# Patient Record
Sex: MALE | Race: WHITE | NOT HISPANIC OR LATINO | Employment: OTHER | URBAN - NONMETROPOLITAN AREA
[De-identification: names, ages, dates, MRNs, and addresses within clinical notes are randomized per-mention and may not be internally consistent; named-entity substitution may affect disease eponyms.]

---

## 2018-12-05 ENCOUNTER — TRANSCRIBE ORDERS (OUTPATIENT)
Dept: ADMINISTRATIVE | Facility: HOSPITAL | Age: 58
End: 2018-12-05

## 2018-12-05 DIAGNOSIS — B18.2 HEP C W/O COMA, CHRONIC (HCC): Primary | ICD-10-CM

## 2018-12-18 ENCOUNTER — LAB (OUTPATIENT)
Dept: LAB | Facility: HOSPITAL | Age: 58
End: 2018-12-18

## 2018-12-18 ENCOUNTER — HOSPITAL ENCOUNTER (OUTPATIENT)
Dept: ULTRASOUND IMAGING | Facility: HOSPITAL | Age: 58
Discharge: HOME | End: 2018-12-18
Admitting: NURSE PRACTITIONER

## 2018-12-18 ENCOUNTER — TRANSCRIBE ORDERS (OUTPATIENT)
Dept: LAB | Facility: HOSPITAL | Age: 58
End: 2018-12-18

## 2018-12-18 DIAGNOSIS — B18.1 HEPATITIS B, CHRONIC (HCC): ICD-10-CM

## 2018-12-18 DIAGNOSIS — B18.2 HEP C W/O COMA, CHRONIC (HCC): ICD-10-CM

## 2018-12-18 DIAGNOSIS — D64.9 ANEMIA, UNSPECIFIED TYPE: ICD-10-CM

## 2018-12-18 DIAGNOSIS — B19.20 HEPATITIS C VIRUS INFECTION WITHOUT HEPATIC COMA, UNSPECIFIED CHRONICITY: ICD-10-CM

## 2018-12-18 DIAGNOSIS — B19.20 HEPATITIS C VIRUS INFECTION WITHOUT HEPATIC COMA, UNSPECIFIED CHRONICITY: Primary | ICD-10-CM

## 2018-12-18 LAB
ALBUMIN SERPL-MCNC: 4.2 G/DL (ref 3.5–5)
ALBUMIN/GLOB SERPL: 1.1 G/DL (ref 1.5–2.5)
ALP SERPL-CCNC: 226 U/L (ref 40–129)
ALT SERPL W P-5'-P-CCNC: 75 U/L (ref 10–44)
ANION GAP SERPL CALCULATED.3IONS-SCNC: 3.8 MMOL/L (ref 3.6–11.2)
AST SERPL-CCNC: 88 U/L (ref 10–34)
BILIRUB SERPL-MCNC: 0.6 MG/DL (ref 0.2–1.8)
BUN BLD-MCNC: 15 MG/DL (ref 7–21)
BUN/CREAT SERPL: 15.6 (ref 7–25)
CALCIUM SPEC-SCNC: 9.5 MG/DL (ref 7.7–10)
CHLORIDE SERPL-SCNC: 105 MMOL/L (ref 99–112)
CO2 SERPL-SCNC: 28.2 MMOL/L (ref 24.3–31.9)
CREAT BLD-MCNC: 0.96 MG/DL (ref 0.43–1.29)
FERRITIN SERPL-MCNC: 132 NG/ML (ref 21.9–321.7)
GFR SERPL CREATININE-BSD FRML MDRD: 80 ML/MIN/1.73
GLOBULIN UR ELPH-MCNC: 4 GM/DL
GLUCOSE BLD-MCNC: 92 MG/DL (ref 70–110)
IRON 24H UR-MRATE: 56 MCG/DL (ref 53–167)
IRON SATN MFR SERPL: 13 % (ref 20–50)
OSMOLALITY SERPL CALC.SUM OF ELEC: 274.3 MOSM/KG (ref 273–305)
POTASSIUM BLD-SCNC: 5.2 MMOL/L (ref 3.5–5.3)
PROT SERPL-MCNC: 8.2 G/DL (ref 6–8)
SODIUM BLD-SCNC: 137 MMOL/L (ref 135–153)
TIBC SERPL-MCNC: 433 MCG/DL (ref 241–421)

## 2018-12-18 PROCEDURE — 80053 COMPREHEN METABOLIC PANEL: CPT

## 2018-12-18 PROCEDURE — 82728 ASSAY OF FERRITIN: CPT

## 2018-12-18 PROCEDURE — 86235 NUCLEAR ANTIGEN ANTIBODY: CPT

## 2018-12-18 PROCEDURE — 76700 US EXAM ABDOM COMPLETE: CPT | Performed by: RADIOLOGY

## 2018-12-18 PROCEDURE — 36415 COLL VENOUS BLD VENIPUNCTURE: CPT

## 2018-12-18 PROCEDURE — 86225 DNA ANTIBODY NATIVE: CPT

## 2018-12-18 PROCEDURE — 83540 ASSAY OF IRON: CPT

## 2018-12-18 PROCEDURE — 87517 HEPATITIS B DNA QUANT: CPT

## 2018-12-18 PROCEDURE — 76700 US EXAM ABDOM COMPLETE: CPT

## 2018-12-18 PROCEDURE — 83550 IRON BINDING TEST: CPT

## 2018-12-18 PROCEDURE — 87350 HEPATITIS BE AG IA: CPT

## 2018-12-20 LAB
CENTROMERE B AB SER-ACNC: <0.2 AI (ref 0–0.9)
CHROMATIN AB SERPL-ACNC: <0.2 AI (ref 0–0.9)
DSDNA AB SER-ACNC: 3 IU/ML (ref 0–9)
ENA JO1 AB SER-ACNC: <0.2 AI (ref 0–0.9)
ENA RNP AB SER-ACNC: 0.4 AI (ref 0–0.9)
ENA SCL70 AB SER-ACNC: <0.2 AI (ref 0–0.9)
ENA SM AB SER-ACNC: <0.2 AI (ref 0–0.9)
ENA SS-A AB SER-ACNC: <0.2 AI (ref 0–0.9)
ENA SS-B AB SER-ACNC: <0.2 AI (ref 0–0.9)
HBV E AG SERPL QL IA: POSITIVE
Lab: NORMAL

## 2018-12-21 LAB
HBV DNA SERPL NAA+PROBE-ACNC: NORMAL IU/ML
LOG10 HBV IU/ML: 6.78 LOG10 IU/ML
TEST INFORMATION: NORMAL

## 2019-11-06 ENCOUNTER — OFFICE VISIT (OUTPATIENT)
Dept: PHARMACY | Facility: HOSPITAL | Age: 59
End: 2019-11-06

## 2019-11-06 VITALS
OXYGEN SATURATION: 98 % | SYSTOLIC BLOOD PRESSURE: 141 MMHG | HEART RATE: 88 BPM | WEIGHT: 170 LBS | DIASTOLIC BLOOD PRESSURE: 97 MMHG | HEIGHT: 69 IN | BODY MASS INDEX: 25.18 KG/M2

## 2019-11-06 DIAGNOSIS — B18.1 HEPATITIS B, CHRONIC (HCC): Primary | ICD-10-CM

## 2019-11-06 DIAGNOSIS — Z86.19 HISTORY OF HEPATITIS C: ICD-10-CM

## 2019-11-06 PROCEDURE — 99203 OFFICE O/P NEW LOW 30 MIN: CPT | Performed by: PHYSICIAN ASSISTANT

## 2019-11-06 RX ORDER — TIZANIDINE 4 MG/1
TABLET ORAL
Refills: 0 | COMMUNITY
Start: 2019-10-22

## 2019-11-06 RX ORDER — GABAPENTIN 800 MG/1
TABLET ORAL
Refills: 0 | COMMUNITY
Start: 2019-10-24

## 2019-11-06 RX ORDER — CLONIDINE HYDROCHLORIDE 0.2 MG/1
TABLET ORAL
Refills: 0 | COMMUNITY
Start: 2019-10-22

## 2019-11-06 RX ORDER — MIRTAZAPINE 45 MG/1
TABLET, FILM COATED ORAL
Refills: 0 | COMMUNITY
Start: 2019-10-22

## 2019-11-06 RX ORDER — CLONAZEPAM 1 MG/1
TABLET ORAL
Refills: 0 | COMMUNITY
Start: 2019-10-24

## 2019-11-06 RX ORDER — LISINOPRIL 40 MG/1
TABLET ORAL
Refills: 0 | COMMUNITY
Start: 2019-10-22

## 2019-11-06 RX ORDER — ERGOCALCIFEROL 1.25 MG/1
CAPSULE ORAL
Refills: 0 | COMMUNITY
Start: 2019-10-22

## 2019-11-06 RX ORDER — PRAVASTATIN SODIUM 40 MG
TABLET ORAL
Refills: 0 | COMMUNITY
Start: 2019-10-22

## 2019-11-06 RX ORDER — TENOFOVIR DISOPROXIL FUMARATE 300 MG/1
TABLET, FILM COATED ORAL
Refills: 2 | COMMUNITY
Start: 2019-10-25

## 2019-11-06 RX ORDER — AMLODIPINE BESYLATE 10 MG/1
TABLET ORAL
Refills: 0 | COMMUNITY
Start: 2019-10-22

## 2019-11-06 RX ORDER — BACLOFEN 20 MG/1
TABLET ORAL
Refills: 0 | COMMUNITY
Start: 2019-10-22

## 2019-11-06 RX ORDER — PAROXETINE HYDROCHLORIDE 20 MG/1
TABLET, FILM COATED ORAL
Refills: 0 | COMMUNITY
Start: 2019-10-22

## 2019-11-06 NOTE — PROGRESS NOTES
": 1960    Chief Complaint   Patient presents with   • Hepatitis C       Carter Neville is a 59 y.o. male who presents to the office today as a consultation from ADRIA Jolley for evaluation of Hepatitis C.    History of Present Illness:  He has been told recently that he cleared Hepatitis C infection on his own without treatment. He is following with Dr. Ortiz, last saw him approx 3 months ago, has upcoming appointment for monitoring. He is taking Viread for treatment of chronic Hepatitis B infection. Was told last visit that his Hep B infection is now \"barely detectable\" and everything was going well. Last labs with PCP 2019 showed normal AST/ALT but elevated alk phos. He would like to continue care with current GI provider. He is not currently using illicit drugs or alcohol. He is feeling well. He had 1st in series of Hep A vaccination, planning to get the 2nd as scheduled.    Review of Systems   Constitutional: Negative for chills, fatigue and fever.   HENT: Negative for trouble swallowing.    Eyes: Negative.    Respiratory: Negative for cough, choking, chest tightness and shortness of breath.    Cardiovascular: Negative for chest pain.   Gastrointestinal: Positive for abdominal pain, anal bleeding, blood in stool, constipation and diarrhea. Negative for abdominal distention, nausea and vomiting.   Endocrine: Negative.    Genitourinary: Negative for difficulty urinating.   Musculoskeletal: Positive for back pain and neck pain.   Skin: Negative for color change, pallor, rash and wound.   Allergic/Immunologic: Negative for environmental allergies and food allergies.   Neurological: Negative for dizziness, light-headedness and headaches.   Hematological: Bruises/bleeds easily.   Psychiatric/Behavioral: Negative.        Past Medical History:   Diagnosis Date   • Hyperlipidemia    • Hypertension    • Infectious viral hepatitis        Past Surgical History:   Procedure Laterality Date   • APPENDECTOMY     • " "CHOLECYSTECTOMY     • HAND SURGERY Right    • HEMORRHOIDECTOMY         Family History   Problem Relation Age of Onset   • Liver cancer Mother    • Lung cancer Father        Social History     Socioeconomic History   • Marital status:      Spouse name: Not on file   • Number of children: Not on file   • Years of education: Not on file   • Highest education level: Not on file   Tobacco Use   • Smoking status: Never Smoker   • Smokeless tobacco: Never Used   Substance and Sexual Activity   • Alcohol use: No     Frequency: Never   • Drug use: No   • Sexual activity: Defer       Current Outpatient Medications:   •  amLODIPine (NORVASC) 10 MG tablet, , Disp: , Rfl: 0  •  baclofen (LIORESAL) 20 MG tablet, , Disp: , Rfl: 0  •  clonazePAM (KlonoPIN) 1 MG tablet, , Disp: , Rfl: 0  •  cloNIDine (CATAPRES) 0.2 MG tablet, , Disp: , Rfl: 0  •  ergocalciferol (ERGOCALCIFEROL) 1.25 MG (49434 UT) capsule, , Disp: , Rfl: 0  •  gabapentin (NEURONTIN) 800 MG tablet, , Disp: , Rfl: 0  •  lisinopril (PRINIVIL,ZESTRIL) 40 MG tablet, , Disp: , Rfl: 0  •  mirtazapine (REMERON) 45 MG tablet, , Disp: , Rfl: 0  •  PARoxetine (PAXIL) 20 MG tablet, , Disp: , Rfl: 0  •  pravastatin (PRAVACHOL) 40 MG tablet, , Disp: , Rfl: 0  •  tenofovir (VIREAD) 300 MG tablet, , Disp: , Rfl: 2  •  tiZANidine (ZANAFLEX) 4 MG tablet, , Disp: , Rfl: 0    Allergies:   Patient has no known allergies.    Vitals:  /97   Pulse 88   Ht 175.3 cm (69\")   Wt 77.1 kg (170 lb)   SpO2 98%   BMI 25.10 kg/m²     Physical Exam   Constitutional: He is oriented to person, place, and time. He appears well-developed and well-nourished. No distress.   HENT:   Head: Normocephalic and atraumatic.   Nose: Nose normal.   Mouth/Throat: Oropharynx is clear and moist.   Eyes: Conjunctivae are normal. Right eye exhibits no discharge. Left eye exhibits no discharge. No scleral icterus.   Neck: Normal range of motion. No JVD present.   Pulmonary/Chest: Effort normal. No " respiratory distress.   Musculoskeletal: Normal range of motion. He exhibits no edema or deformity.   Neurological: He is alert and oriented to person, place, and time. Coordination normal.   Skin: No rash noted. He is not diaphoretic. No erythema.   Psychiatric: He has a normal mood and affect. His behavior is normal. Judgment and thought content normal.   Vitals reviewed.    Results Review:  9/2019 Labs: H/H normal, platelets 221,000, alk phos 191, AST 17, ALT 13, total bilirubin 0.5, cholesterol 209, , , Acute hepatitis panel pos Hep C Ab, pos Hep B surf Ag, negative Hep A IgM, amylase normal, lipase normal.    Assessment:  1. Hepatitis B, chronic (CMS/HCC)    2. History of hepatitis C      Plan:  He will continue following with current GI provider, Dr. Ortiz. He is already on chronic Hepatitis B therapy. Hep C has cleared on it's own without treatment. He will continue with planned 2nd vaccination in series for immunity to Hep A. Continue to abstain from illicit drugs and alcohol use. Call with any concerns.         Return if symptoms worsen or fail to improve.      Electronically signed 11/6/2019 10:05 AM  Hanh Keith PA-C, [facility]

## 2020-09-05 ENCOUNTER — APPOINTMENT (OUTPATIENT)
Dept: CT IMAGING | Facility: HOSPITAL | Age: 60
End: 2020-09-05

## 2020-09-05 PROCEDURE — 72128 CT CHEST SPINE W/O DYE: CPT

## 2020-09-05 PROCEDURE — 70450 CT HEAD/BRAIN W/O DYE: CPT

## 2020-09-05 PROCEDURE — 99284 EMERGENCY DEPT VISIT MOD MDM: CPT

## 2020-09-05 PROCEDURE — 72125 CT NECK SPINE W/O DYE: CPT

## 2020-09-06 ENCOUNTER — HOSPITAL ENCOUNTER (EMERGENCY)
Facility: HOSPITAL | Age: 60
Discharge: HOME | End: 2020-09-06
Attending: FAMILY MEDICINE | Admitting: FAMILY MEDICINE

## 2020-09-06 ENCOUNTER — APPOINTMENT (OUTPATIENT)
Dept: GENERAL RADIOLOGY | Facility: HOSPITAL | Age: 60
End: 2020-09-06

## 2020-09-06 VITALS
SYSTOLIC BLOOD PRESSURE: 145 MMHG | BODY MASS INDEX: 26.66 KG/M2 | HEIGHT: 69 IN | RESPIRATION RATE: 20 BRPM | WEIGHT: 180 LBS | OXYGEN SATURATION: 99 % | TEMPERATURE: 98.2 F | HEART RATE: 79 BPM | DIASTOLIC BLOOD PRESSURE: 95 MMHG
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
## 2020-09-06 DIAGNOSIS — M54.2 ACUTE NECK PAIN: ICD-10-CM

## 2020-09-06 DIAGNOSIS — S09.90XA INJURY OF HEAD, INITIAL ENCOUNTER: ICD-10-CM

## 2020-09-06 PROCEDURE — 73030 X-RAY EXAM OF SHOULDER: CPT

## 2020-09-06 RX ORDER — HYDROCODONE BITARTRATE AND ACETAMINOPHEN 5; 325 MG/1; MG/1
1 TABLET ORAL EVERY 6 HOURS PRN
Qty: 6 TABLET | Refills: 0 | Status: SHIPPED | OUTPATIENT
Start: 2020-09-06

## 2020-09-06 RX ORDER — HYDROCODONE BITARTRATE AND ACETAMINOPHEN 7.5; 325 MG/1; MG/1
1 TABLET ORAL ONCE
Status: COMPLETED | OUTPATIENT
Start: 2020-09-06 | End: 2020-09-06

## 2020-09-06 RX ADMIN — HYDROCODONE BITARTRATE AND ACETAMINOPHEN 1 TABLET: 7.5; 325 TABLET ORAL at 02:56

## 2020-09-06 NOTE — ED NOTES
Fall bracelet applied. Patient verbalized understanding not to get up without assistance      Jose Mcgrath, RN  09/05/20 5695

## 2020-09-06 NOTE — ED NOTES
Pt resting quietly on stretcher with complaints of shoulder pain. Pt AAOx4 with no resp distress noted, respirations even and unlabored.  Pt denies any needs at this time.  Skin PWD.  Pt family at bedside. Will continue to monitor and follow plan of care.  Bed rails up x2, bed in lowest position, call light in reach.           Itzel Huston, RN  09/06/20 9056

## 2020-09-06 NOTE — ED PROVIDER NOTES
Subjective   Patient is a 60-year-old male with hypertension and hyperlipidemia that presents the ED after sustaining a fall at approximately 7:00 this evening.  He states he was on a ladder when he fell.  He states he was just on the second or third railing when he lost his balance and fell backwards.  He states he hit the back of his head on a concrete surface.  He states it knocked the breath out of him and he lost consciousness for a few seconds.  He is complaining of a mild throbbing headache and neck pain.  He is also complaining of throbbing pain in his right shoulder worse with movement.  He denies chest pain, shortness of breath, dizziness, change in vision, nausea, vomiting, extremity weakness, abdominal pain, paresthesias, or dysuria.      History provided by:  Patient   used: No    Fall   Mechanism of injury: fall    Injury location:  Head/neck  Head/neck injury location:  Head  Arrived directly from scene: yes    Fall:     Fall occurred:  From a ladder    Height of fall:  3    Impact surface:  [place]    Point of impact:  Head    Entrapped after fall: no    Suspicion of alcohol use: no    Suspicion of drug use: no    Tetanus status:  Up to date  Prior to arrival data:     Bystander interventions:  None    Patient ambulatory at scene: yes      Blood loss:  None    Responsiveness at scene:  Alert    Orientation at scene:  Person, place, situation and time    Loss of consciousness: yes      Loss of consciousness duration:  10 seconds    Amnesic to event: no      Airway interventions:  None    Breathing interventions:  None    IV access status:  None    IO access:  None    Fluids administered:  None    Cardiac interventions:  None    Medications administered:  None    Immobilization:  None    Airway condition since incident:  Stable    Breathing condition since incident:  Stable    Circulation condition since incident:  Stable    Mental status condition since incident:   Stable  Associated symptoms: headaches, loss of consciousness and neck pain    Associated symptoms: no abdominal pain, no back pain, no blindness, no chest pain, no difficulty breathing, no hearing loss, no nausea, no seizures and no vomiting        Review of Systems   Constitutional: Negative.  Negative for chills and fever.   HENT: Negative for congestion, dental problem, drooling, ear discharge, ear pain, facial swelling, hearing loss, postnasal drip, rhinorrhea, sinus pressure, sinus pain, sneezing, sore throat, tinnitus and trouble swallowing.    Eyes: Negative.  Negative for blindness, pain, discharge, redness and itching.   Respiratory: Negative.  Negative for cough, chest tightness, shortness of breath and wheezing.    Cardiovascular: Negative.  Negative for chest pain and palpitations.   Gastrointestinal: Negative.  Negative for abdominal distention, abdominal pain, diarrhea, nausea and vomiting.   Endocrine: Negative.    Genitourinary: Negative.  Negative for dysuria, flank pain, frequency and urgency.   Musculoskeletal: Positive for arthralgias and neck pain. Negative for back pain, gait problem, joint swelling and neck stiffness.   Skin: Negative.    Neurological: Positive for loss of consciousness and headaches. Negative for dizziness, seizures, syncope, facial asymmetry, weakness and light-headedness.   Psychiatric/Behavioral: Negative.  Negative for confusion.   All other systems reviewed and are negative.      Past Medical History:   Diagnosis Date   • Hyperlipidemia    • Hypertension    • Infectious viral hepatitis        No Known Allergies    Past Surgical History:   Procedure Laterality Date   • APPENDECTOMY     • CHOLECYSTECTOMY     • HAND SURGERY Right    • HEMORRHOIDECTOMY         Family History   Problem Relation Age of Onset   • Liver cancer Mother    • Lung cancer Father        Social History     Socioeconomic History   • Marital status:      Spouse name: Not on file   • Number of  children: Not on file   • Years of education: Not on file   • Highest education level: Not on file   Tobacco Use   • Smoking status: Never Smoker   • Smokeless tobacco: Never Used   Substance and Sexual Activity   • Alcohol use: No     Frequency: Never   • Drug use: No   • Sexual activity: Defer           Objective   Physical Exam   Constitutional: He is oriented to person, place, and time. He appears well-developed and well-nourished. No distress.   HENT:   Head: Normocephalic and atraumatic.   Right Ear: External ear normal.   Left Ear: External ear normal.   Nose: Nose normal.   Mouth/Throat: Uvula is midline, oropharynx is clear and moist and mucous membranes are normal.   Eyes: Pupils are equal, round, and reactive to light. Conjunctivae and EOM are normal. Right eye exhibits normal extraocular motion and no nystagmus. Left eye exhibits normal extraocular motion and no nystagmus.   Neck: Normal range of motion. Neck supple. No JVD present. No tracheal deviation present.   Cardiovascular: Normal rate, regular rhythm and normal heart sounds.   No murmur heard.  Pulmonary/Chest: Effort normal and breath sounds normal. No respiratory distress. He has no wheezes.   Abdominal: Soft. Bowel sounds are normal. He exhibits no distension. There is no tenderness. There is no rebound and no guarding.   Musculoskeletal: He exhibits no edema or deformity.        Right shoulder: He exhibits decreased range of motion and bony tenderness. He exhibits no swelling, no effusion, no deformity and no laceration.   Neurological: He is alert and oriented to person, place, and time. He has normal strength. No cranial nerve deficit or sensory deficit. He displays a negative Romberg sign. GCS eye subscore is 4. GCS verbal subscore is 5. GCS motor subscore is 6.   Skin: Skin is warm and dry. No rash noted. He is not diaphoretic. No erythema. No pallor.   Psychiatric: He has a normal mood and affect. His behavior is normal. Thought content  normal.   Nursing note and vitals reviewed.      Procedures           ED Course  ED Course as of Sep 06 0415   Sun Sep 06, 2020   0119 FINDINGS:   No intracranial hemorrhage, mass, or infarct. No hydrocephalus or extra-axial fluid collection. There are senescent changes, including volume loss and nonspecific white matter change, but no acute abnormality is seen. The skull base, calvarium, and  extracranial soft tissues are normal.     IMPRESSION:  Senescent changes without acute abnormality.              Signer Name: SHELLIE Olivier MD   Signed: 9/5/2020 10:08 PM   CT Head Without Contrast [MH]   0119 IMPRESSION:  Degenerative disc disease most pronounced C4-C5 with spinal and foraminal stenosis. No acute findings.     Signer Name: SHELLIE Olivier MD   Signed: 9/5/2020 10:10 PM   CT Cervical Spine Without Contrast [MH]   0120 IMPRESSION:  No acute traumatic findings.     Signer Name: SHELLIE Olivier MD   Signed: 9/5/2020 10:21 PM   CT Thoracic Spine Without Contrast [MH]   0202 IMPRESSION:  Degenerative disease with no acute abnormality identified.     Signer Name: Pramod Oh MD   Signed: 9/6/2020 1:55 AM   XR Shoulder 2+ View Right [MH]   0305 Discussed plan of care with patient.  Advised if symptoms worsen to return to the ED.  Advised to follow-up with PCP in 1 to 2 days.    []      ED Course User Index  [] Madisyn Mccord PA-C                                           [facility]    Final diagnoses:   Fall, initial encounter   Acute neck pain   Injury of head, initial encounter            Madisyn Mccord PA-C  09/06/20 [id number]

## 2023-05-18 ENCOUNTER — APPOINTMENT (OUTPATIENT)
Dept: CT IMAGING | Facility: HOSPITAL | Age: 63
End: 2023-05-18
Payer: MEDICARE

## 2023-05-18 ENCOUNTER — HOSPITAL ENCOUNTER (EMERGENCY)
Facility: HOSPITAL | Age: 63
Discharge: HOME | End: 2023-05-18
Attending: STUDENT IN AN ORGANIZED HEALTH CARE EDUCATION/TRAINING PROGRAM
Payer: MEDICARE

## 2023-05-18 VITALS
RESPIRATION RATE: 18 BRPM | TEMPERATURE: 97.9 F | HEIGHT: 69 IN | BODY MASS INDEX: 26.66 KG/M2 | OXYGEN SATURATION: 95 % | WEIGHT: 180 LBS | DIASTOLIC BLOOD PRESSURE: 88 MMHG | HEART RATE: 88 BPM | SYSTOLIC BLOOD PRESSURE: 164 MMHG

## 2023-05-18 DIAGNOSIS — L03.316 CELLULITIS OF UMBILICUS: Primary | ICD-10-CM

## 2023-05-18 PROCEDURE — 74176 CT ABD & PELVIS W/O CONTRAST: CPT

## 2023-05-18 PROCEDURE — 74176 CT ABD & PELVIS W/O CONTRAST: CPT | Performed by: RADIOLOGY

## 2023-05-18 PROCEDURE — 99283 EMERGENCY DEPT VISIT LOW MDM: CPT

## 2023-05-18 RX ORDER — DOXYCYCLINE 100 MG/1
100 CAPSULE ORAL ONCE
Status: COMPLETED | OUTPATIENT
Start: 2023-05-18 | End: 2023-05-18

## 2023-05-18 RX ORDER — DOXYCYCLINE 100 MG/1
100 CAPSULE ORAL 2 TIMES DAILY
Qty: 13 CAPSULE | Refills: 0 | Status: SHIPPED | OUTPATIENT
Start: 2023-05-18

## 2023-05-18 RX ADMIN — DOXYCYCLINE 100 MG: 100 CAPSULE ORAL at 12:58

## 2023-05-18 NOTE — ED PROVIDER NOTES
Subjective   History of Present Illness  63-year-old male presents to the ER due to concerns for drainage from his umbilical region.  Patient noted he removed the remnants of a tick.  Patient is unsure of tick species.  Patient believes he removed the entirety of the insect.  No concerns for significant erythema or increased warmth.  Patient notes some mild drainage.  No significant abdominal pain.  Intermittent nausea.  No chest pain or shortness of breath.  Vital stable.  Afebrile        Review of Systems   Gastrointestinal: Positive for abdominal pain.   Skin:        Insect bite   All other systems reviewed and are negative.      Past Medical History:   Diagnosis Date   • Hyperlipidemia    • Hypertension    • Infectious viral hepatitis        No Known Allergies    Past Surgical History:   Procedure Laterality Date   • APPENDECTOMY     • CHOLECYSTECTOMY     • HAND SURGERY Right    • HEMORRHOIDECTOMY         Family History   Problem Relation Age of Onset   • Liver cancer Mother    • Lung cancer Father        Social History     Socioeconomic History   • Marital status:    Tobacco Use   • Smoking status: Never   • Smokeless tobacco: Never   Substance and Sexual Activity   • Alcohol use: No   • Drug use: No   • Sexual activity: Defer           Objective   Physical Exam  Constitutional:       General: He is not in acute distress.     Appearance: He is well-developed. He is not ill-appearing.   HENT:      Head: Normocephalic and atraumatic.   Eyes:      Extraocular Movements: Extraocular movements intact.      Pupils: Pupils are equal, round, and reactive to light.   Neck:      Vascular: No JVD.   Cardiovascular:      Rate and Rhythm: Normal rate and regular rhythm.      Heart sounds: Normal heart sounds. No murmur heard.  Pulmonary:      Effort: No tachypnea, accessory muscle usage or respiratory distress.      Breath sounds: Normal breath sounds. No stridor. No decreased breath sounds, wheezing, rhonchi or  rales.   Chest:      Chest wall: No deformity, tenderness or crepitus.   Abdominal:      General: Bowel sounds are normal.      Palpations: Abdomen is soft.      Tenderness: There is abdominal tenderness in the periumbilical area. There is no guarding or rebound.      Comments: Cottonball stuffed into patient's umbilicus.  No significant drainage noted on evaluation.  No surrounding erythema   Musculoskeletal:         General: Normal range of motion.      Cervical back: Normal range of motion and neck supple.      Right lower leg: No tenderness. No edema.      Left lower leg: No tenderness. No edema.   Lymphadenopathy:      Cervical: No cervical adenopathy.   Skin:     General: Skin is warm and dry.      Coloration: Skin is not cyanotic.      Findings: No ecchymosis or erythema.   Neurological:      General: No focal deficit present.      Mental Status: He is alert and oriented to person, place, and time.      Cranial Nerves: No cranial nerve deficit.      Motor: No weakness.   Psychiatric:         Mood and Affect: Mood normal. Mood is not anxious.         Behavior: Behavior normal. Behavior is not agitated.         Procedures           ED Course      CT Abdomen Pelvis Without Contrast    Result Date: 5/18/2023  1.  No acute findings within abdomen or pelvis. 2.  Moderate constipation. No bowel obstruction. 3.  Prior appendectomy and cholecystectomy. 4.  No abdominal wall fluid collections or body wall edema identified. 5.  Other incidental and nonacute findings as above.  This report was finalized on 5/18/2023 2:46 PM by Dr. Jacob Fair MD.                                           Medical Decision Making  Patient was initiated on doxycycline given concerns for tick bite associated cellulitis.  CT of the abdomen pelvis noted no acute abnormality.  Patient will continue doxycycline for the next 7 days.  Work up and results were discussed throughly with the patient.  The patient will be discharged for further  monitoring and management with their PCP.  Red flags, warning signs, worsening symptoms, and when to return to the ER discussed with and understood by the patient.  Patient will follow up with their PCP in a timely manner.  Vitals stable at discharge.    Cellulitis of umbilicus: complicated acute illness or injury  Amount and/or Complexity of Data Reviewed  Radiology: ordered. Decision-making details documented in ED Course.      Risk  Prescription drug management.          Final diagnoses:   Cellulitis of umbilicus       ED Disposition  ED Disposition     ED Disposition   Discharge    Condition   Stable    Comment   --             Lyssa Bourne, APRN  [address] [facility] [id number] [phone]    In 1 week      [facility] Emergency Department  [address] [place] [id number] [phone]    If symptoms worsen         Medication List      New Prescriptions    doxycycline 100 MG capsule  Commonly known as: MONODOX  Take 1 capsule by mouth 2 (Two) Times a Day.           Where to Get Your Medications      These medications were sent to [address] - [phone] [place] [id number] [address] [address]    Phone: [phone]   · doxycycline 100 MG capsule          Bob Bartholomew DO  05/18/23 4683       Bob Bartholomew DO  05/18/23 6077

## 2024-01-13 ENCOUNTER — HOSPITAL ENCOUNTER (EMERGENCY)
Facility: HOSPITAL | Age: 64
Discharge: HOME | End: 2024-01-13
Attending: STUDENT IN AN ORGANIZED HEALTH CARE EDUCATION/TRAINING PROGRAM
Payer: MEDICARE

## 2024-01-13 VITALS
DIASTOLIC BLOOD PRESSURE: 106 MMHG | SYSTOLIC BLOOD PRESSURE: 186 MMHG | HEART RATE: 81 BPM | RESPIRATION RATE: 16 BRPM | BODY MASS INDEX: 22.96 KG/M2 | HEIGHT: 69 IN | WEIGHT: 155 LBS | TEMPERATURE: 98.5 F | OXYGEN SATURATION: 95 %

## 2024-01-13 DIAGNOSIS — L98.9 SKIN LESIONS: ICD-10-CM

## 2024-01-13 DIAGNOSIS — I10 PRIMARY HYPERTENSION: Primary | ICD-10-CM

## 2024-01-13 PROCEDURE — 99283 EMERGENCY DEPT VISIT LOW MDM: CPT

## 2024-01-13 RX ORDER — AMLODIPINE BESYLATE 5 MG/1
10 TABLET ORAL ONCE
Status: COMPLETED | OUTPATIENT
Start: 2024-01-13 | End: 2024-01-13

## 2024-01-13 RX ORDER — BENZOCAINE/MENTHOL 6 MG-10 MG
1 LOZENGE MUCOUS MEMBRANE EVERY 12 HOURS SCHEDULED
Status: DISCONTINUED | OUTPATIENT
Start: 2024-01-13 | End: 2024-01-13 | Stop reason: HOSPADM

## 2024-01-13 RX ORDER — LISINOPRIL 10 MG/1
40 TABLET ORAL ONCE
Status: COMPLETED | OUTPATIENT
Start: 2024-01-13 | End: 2024-01-13

## 2024-01-13 RX ORDER — CLONIDINE HYDROCHLORIDE 0.1 MG/1
0.2 TABLET ORAL ONCE
Status: COMPLETED | OUTPATIENT
Start: 2024-01-13 | End: 2024-01-13

## 2024-01-13 RX ADMIN — CLONIDINE HYDROCHLORIDE 0.2 MG: 0.1 TABLET ORAL at 05:28

## 2024-01-13 RX ADMIN — HYDROCORTISONE 1 APPLICATION: 1 CREAM TOPICAL at 06:16

## 2024-01-13 RX ADMIN — AMLODIPINE BESYLATE 10 MG: 5 TABLET ORAL at 05:28

## 2024-01-13 RX ADMIN — LISINOPRIL 40 MG: 10 TABLET ORAL at 05:28

## 2024-01-13 NOTE — DISCHARGE INSTRUCTIONS
Place hydrocortisone cream on the lesions at least twice daily for a week.  If they do not improve after a week would recommend following up with dermatology in [facility].

## 2024-01-13 NOTE — ED PROVIDER NOTES
"Subjective   History of Present Illness  Patient is a 63-year-old male with a history of hypertension and hyperlipidemia who presents with concern for skin lesions on his arms.  He states that he has had these lesions on his arms and legs and that he is convinced that occasionally he \"sees a worm.\"  Patient adamantly denies any type of illicit drug use.       Review of Systems    Past Medical History:   Diagnosis Date    Hyperlipidemia     Hypertension     Infectious viral hepatitis        No Known Allergies    Past Surgical History:   Procedure Laterality Date    APPENDECTOMY      CHOLECYSTECTOMY      HAND SURGERY Right     HEMORRHOIDECTOMY         Family History   Problem Relation Age of Onset    Liver cancer Mother     Lung cancer Father        Social History     Socioeconomic History    Marital status:    Tobacco Use    Smoking status: Never    Smokeless tobacco: Never   Substance and Sexual Activity    Alcohol use: No    Drug use: No    Sexual activity: Defer           Objective   Physical Exam  Vitals and nursing note reviewed.   Constitutional:       General: He is not in acute distress.     Appearance: He is well-developed. He is not diaphoretic.   HENT:      Head: Normocephalic and atraumatic.      Right Ear: External ear normal.      Left Ear: External ear normal.      Nose: Nose normal.   Eyes:      Conjunctiva/sclera: Conjunctivae normal.      Pupils: Pupils are equal, round, and reactive to light.   Neck:      Vascular: No JVD.      Trachea: No tracheal deviation.   Cardiovascular:      Rate and Rhythm: Normal rate and regular rhythm.      Heart sounds: Normal heart sounds. No murmur heard.  Pulmonary:      Effort: Pulmonary effort is normal. No respiratory distress.      Breath sounds: Normal breath sounds. No wheezing.   Abdominal:      General: Bowel sounds are normal.      Palpations: Abdomen is soft.      Tenderness: There is no abdominal tenderness.   Musculoskeletal:         General: No " "deformity. Normal range of motion.      Cervical back: Normal range of motion and neck supple.   Skin:     General: Skin is warm and dry.      Coloration: Skin is not pale.      Findings: Lesion and rash present. No erythema.      Comments: Has various skin lesions on his arms that are dry and scabbed over.  He does reflex to chronic itching or picking there is no active bleeding.   Neurological:      Mental Status: He is alert and oriented to person, place, and time.      Cranial Nerves: No cranial nerve deficit.   Psychiatric:         Behavior: Behavior normal.         Thought Content: Thought content normal.         Procedures           ED Course  ED Course as of 01/13/24 0605   Sat Jan 13, 2024   0559 Patient actually has no complaints of headache changes in vision or chest pain or shortness of breath.  He states that he has not taken his morning medications and has chronic hypertension.  Although patient denies methamphetamine use skin lesions and paranoia of \"I see a warm coming out of it known to see it.  \"Makes it more questioning or compelling that he may use illicit drugs.    Recommended that if cortisone cream does not improve the rash that he should follow-up with dermatology for skin biopsies. [LK]      ED Course User Index  [LK] Fauzia Hidalgo DO KASPER reviewed by Fauzia Hidalgo DO       Medical Decision Making  Risk  Prescription drug management.        Final diagnoses:   Primary hypertension   Skin lesions       ED Disposition  ED Disposition       ED Disposition   Discharge    Condition   Stable    Comment   --               No follow-up provider specified.       Medication List      No changes were made to your prescriptions during this visit.         "